# Patient Record
Sex: MALE | Race: WHITE | URBAN - METROPOLITAN AREA
[De-identification: names, ages, dates, MRNs, and addresses within clinical notes are randomized per-mention and may not be internally consistent; named-entity substitution may affect disease eponyms.]

---

## 2018-02-23 ENCOUNTER — OFFICE VISIT (OUTPATIENT)
Dept: FAMILY MEDICINE CLINIC | Age: 23
End: 2018-02-23

## 2018-02-23 VITALS
OXYGEN SATURATION: 99 % | HEART RATE: 69 BPM | WEIGHT: 185 LBS | BODY MASS INDEX: 29.73 KG/M2 | RESPIRATION RATE: 19 BRPM | DIASTOLIC BLOOD PRESSURE: 83 MMHG | HEIGHT: 66 IN | TEMPERATURE: 97.9 F | SYSTOLIC BLOOD PRESSURE: 131 MMHG

## 2018-02-23 DIAGNOSIS — Z00.00 ROUTINE GENERAL MEDICAL EXAMINATION AT A HEALTH CARE FACILITY: Primary | ICD-10-CM

## 2018-02-23 DIAGNOSIS — E66.3 OVERWEIGHT (BMI 25.0-29.9): ICD-10-CM

## 2018-02-23 NOTE — MR AVS SNAPSHOT
[address] [facility] 
[phone] Patient: Gabi Smith MRN: [id number] :1995 Visit Information Date & Time Provider Department Dept. Phone Encounter #  
 2018  2:00 PM Stacey Burgos  [facility] [phone] [id number] Follow-up Instructions Return in about 1 year (around 2019) for Annual Exam - 30 minutes. Upcoming Health Maintenance Date Due DTaP/Tdap/Td series (1 - Tdap) 2014 Allergies as of 2018  Review Complete On: 2018 By: Stacey Burgos NP No Known Allergies Current Immunizations  Reviewed on 2018 No immunizations on file. Reviewed by Stacey Burgos NP on 2018 at  2:41 PM  
You Were Diagnosed With   
  
 Codes Comments Routine general medical examination at a health care facility    -  Primary ICD-10-CM: Z00.00 ICD-9-CM: V70.0 Overweight (BMI 25.0-29. 9)     ICD-10-CM: M39.6 ICD-9-CM: 278.02 Vitals BP Pulse Temp Resp Height(growth percentile) Weight(growth percentile) 131/83 (BP 1 Location: Right arm, BP Patient Position: Sitting) 69 97.9 °F (36.6 °C) (Oral) 19 5' 6\" (1.676 m) 185 lb (83.9 kg) SpO2 BMI Smoking Status 99% 29.86 kg/m2 Never Smoker Vitals History BMI and BSA Data Body Mass Index Body Surface Area  
 29.86 kg/m 2 1.98 m 2 Preferred Pharmacy Pharmacy Name Phone [facility], [address] [place] [address] [phone] Your Updated Medication List  
  
   
This list is accurate as of 18  2:42 PM.  Always use your most recent med list.  
  
  
  
  
 VITAFOL GUMMIES 3.33 mg iron- 0.33 mg Wandra Cypher Generic drug:  -iron-FA-om-3s-dha-epa Take  by mouth. Follow-up Instructions Return in about 1 year (around 2/23/2019) for Annual Exam - 30 minutes. Patient Instructions Well Visit, Ages 25 to 48: Care Instructions Your Care Instructions Physical exams can help you stay healthy. Your doctor has checked your overall health and may have suggested ways to take good care of yourself. He or she also may have recommended tests. At home, you can help prevent illness with healthy eating, regular exercise, and other steps. Follow-up care is a key part of your treatment and safety. Be sure to make and go to all appointments, and call your doctor if you are having problems. It's also a good idea to know your test results and keep a list of the medicines you take. How can you care for yourself at home? · Reach and stay at a healthy weight. This will lower your risk for many problems, such as obesity, diabetes, heart disease, and high blood pressure. · Get at least 30 minutes of physical activity on most days of the week. Walking is a good choice. You also may want to do other activities, such as running, swimming, cycling, or playing tennis or team sports. Discuss any changes in your exercise program with your doctor. · Do not smoke or allow others to smoke around you. If you need help quitting, talk to your doctor about stop-smoking programs and medicines. These can increase your chances of quitting for good. · Talk to your doctor about whether you have any risk factors for sexually transmitted infections (STIs). Having one sex partner (who does not have STIs and does not have sex with anyone else) is a good way to avoid these infections. · Use birth control if you do not want to have children at this time. Talk with your doctor about the choices available and what might be best for you. · Protect your skin from too much sun.  When you're outdoors from 10 a.m. to 4 p.m., stay in the shade or cover up with clothing and a hat with a wide brim. Wear sunglasses that block UV rays. Even when it's cloudy, put broad-spectrum sunscreen (SPF 30 or higher) on any exposed skin. · See a dentist one or two times a year for checkups and to have your teeth cleaned. · Wear a seat belt in the car. · Drink alcohol in moderation, if at all. That means no more than 2 drinks a day for men and 1 drink a day for women. Follow your doctor's advice about when to have certain tests. These tests can spot problems early. For everyone · Cholesterol. Have the fat (cholesterol) in your blood tested after age 21. Your doctor will tell you how often to have this done based on your age, family history, or other things that can increase your risk for heart disease. · Blood pressure. Have your blood pressure checked during a routine doctor visit. Your doctor will tell you how often to check your blood pressure based on your age, your blood pressure results, and other factors. · Vision. Talk with your doctor about how often to have a glaucoma test. 
· Diabetes. Ask your doctor whether you should have tests for diabetes. · Colon cancer. Have a test for colon cancer at age 48. You may have one of several tests. If you are younger than 48, you may need a test earlier if you have any risk factors. Risk factors include whether you already had a precancerous polyp removed from your colon or whether your parent, brother, sister, or child has had colon cancer. For women · Breast exam and mammogram. Talk to your doctor about when you should have a clinical breast exam and a mammogram. Medical experts differ on whether and how often women under 50 should have these tests. Your doctor can help you decide what is right for you. · Pap test and pelvic exam. Begin Pap tests at age 24. A Pap test is the best way to find cervical cancer.  The test often is part of a pelvic exam. Ask how often to have this test. 
 · Tests for sexually transmitted infections (STIs). Ask whether you should have tests for STIs. You may be at risk if you have sex with more than one person, especially if your partners do not wear condoms. For men · Tests for sexually transmitted infections (STIs). Ask whether you should have tests for STIs. You may be at risk if you have sex with more than one person, especially if you do not wear a condom. · Testicular cancer exam. Ask your doctor whether you should check your testicles regularly. · Prostate exam. Talk to your doctor about whether you should have a blood test (called a PSA test) for prostate cancer. Experts differ on whether and when men should have this test. Some experts suggest it if you are older than 39 and are -American or have a father or brother who got prostate cancer when he was younger than 72. When should you call for help? Watch closely for changes in your health, and be sure to contact your doctor if you have any problems or symptoms that concern you. Where can you learn more? Go to http://hamilton-elvi.info/. Enter P072 in the search box to learn more about \"Well Visit, Ages 25 to 48: Care Instructions. \" Current as of: May 12, 2017 Content Version: 11.4 © 9739-0627 Healthwise, Incorporated. Care instructions adapted under license by [employer] (which disclaims liability or warranty for this information). If you have questions about a medical condition or this instruction, always ask your healthcare professional. [place] [address] any warranty or liability for your use of this information. Introducing [facility] & HEALTH SERVICES! [facility] introduces [employer] patient portal. Now you can access parts of your medical record, email your doctor's office, and request medication refills online. 1. In your internet browser, go to [email or web address] 2. Click on the First Time User? Click Here link in the Sign In box. You will see the New Member Sign Up page. 3. Enter your CATASYS Access Code exactly as it appears below. You will not need to use this code after youve completed the sign-up process. If you do not sign up before the expiration date, you must request a new code. · CATASYS Access Code: [email or web address] Expires: 5/24/2018  2:42 PM 
 
4. Enter the last four digits of your Social Security Number (xxxx) and Date of Birth (mm/dd/yyyy) as indicated and click Submit. You will be taken to the next sign-up page. 5. Create a CATASYS ID. This will be your CATASYS login ID and cannot be changed, so think of one that is secure and easy to remember. 6. Create a CATASYS password. You can change your password at any time. 7. Enter your Password Reset Question and Answer. This can be used at a later time if you forget your password. 8. Enter your e-mail address. You will receive e-mail notification when new information is available in [address] 9. Click Sign Up. You can now view and download portions of your medical record. 10. Click the Download Summary menu link to download a portable copy of your medical information. If you have questions, please visit the Frequently Asked Questions section of the CATASYS website. Remember, CATASYS is NOT to be used for urgent needs. For medical emergencies, dial 911. Now available from your iPhone and Android! Please provide this summary of care documentation to your next provider. If you have any questions after today's visit, please call [phone].

## 2018-02-23 NOTE — PROGRESS NOTES
Chief Complaint   Patient presents with    New Patient     1. Have you been to the ER, urgent care clinic since your last visit? Hospitalized since your last visit? No    2. Have you seen or consulted any other health care providers outside of the [address] [place] [address] since your last visit? Include any pap smears or colon screening.  No

## 2018-02-23 NOTE — PATIENT INSTRUCTIONS

## 2018-02-23 NOTE — PROGRESS NOTES
Marge Joe is a 23 y.o. male who was seen in clinic today (2/23/2018). Subjective:  Physical Exam  Patient presents for for annual physical exam. He denies any current medical problems or concerns. Questionnaire and forms for [employer] reviewed with patient and responses verified. Immunizations are up to date per father. Current Outpatient Prescriptions   Medication Sig Dispense Refill    -iron-FA-om-3s-dha-epa (VITAFOL GUMMIES) 3.33 mg iron- 0.33 mg chew Take  by mouth. Past Medical History:   Diagnosis Date    Communication disability     Mental retardation      Past Surgical History:   Procedure Laterality Date    HX WISDOM TEETH EXTRACTION       History reviewed. No pertinent family history. Social History   Substance Use Topics    Smoking status: Never Smoker    Smokeless tobacco: Never Used    Alcohol use No         Prior to Admission medications    Medication Sig Start Date End Date Taking? Authorizing Provider   -iron-FA-om-3s-dha-epa (VITAFOL GUMMIES) 3.33 mg iron- 0.33 mg chew Take  by mouth. Yes Historical Provider          No Known Allergies        Review of Systems   Constitutional: Negative for malaise/fatigue and weight loss. HENT: Negative for hearing loss. Eyes: Negative for blurred vision. Respiratory: Negative for shortness of breath. Cardiovascular: Negative for chest pain, palpitations and leg swelling. Gastrointestinal: Negative for abdominal pain, constipation, diarrhea and heartburn. Genitourinary: Negative for frequency and urgency. Musculoskeletal: Negative for back pain, joint pain and myalgias. Neurological: Negative for dizziness, weakness and headaches. Endo/Heme/Allergies: Does not bruise/bleed easily. Psychiatric/Behavioral: Negative for depression. Objective:   Physical Exam   Constitutional: He is oriented to person, place, and time. He appears well-developed and well-nourished. No distress. HENT:   Right Ear: Tympanic membrane and ear canal normal.   Left Ear: Tympanic membrane and ear canal normal.   Nose: No mucosal edema. Right sinus exhibits no maxillary sinus tenderness and no frontal sinus tenderness. Left sinus exhibits no maxillary sinus tenderness and no frontal sinus tenderness. Mouth/Throat: Oropharynx is clear and moist.   Eyes: EOM are normal. Pupils are equal, round, and reactive to light. Neck: Normal range of motion. Neck supple. No JVD present. Carotid bruit is not present. No thyromegaly present. Cardiovascular: Normal rate, regular rhythm, normal heart sounds and intact distal pulses. Exam reveals no gallop and no friction rub. No murmur heard. Pulmonary/Chest: Effort normal and breath sounds normal. No respiratory distress. He has no decreased breath sounds. He has no wheezes. He has no rhonchi. Abdominal: Soft. Bowel sounds are normal. He exhibits no distension. There is no tenderness. Musculoskeletal: He exhibits no edema. Lymphadenopathy:     He has no cervical adenopathy. Neurological: He is alert and oriented to person, place, and time. Psychiatric: He has a normal mood and affect. His behavior is normal.   Nursing note and vitals reviewed. Visit Vitals    /83 (BP 1 Location: Right arm, BP Patient Position: Sitting)    Pulse 69    Temp 97.9 °F (36.6 °C) (Oral)    Resp 19    Ht 5' 6\" (1.676 m)    Wt 185 lb (83.9 kg)    SpO2 99%    BMI 29.86 kg/m2       Assessment & Plan:  Diagnoses and all orders for this visit:    1. Routine general medical examination at a health care facility  Well adult, reviewed routine screenings as well as healthy diet and lifestyle practices    2. Overweight (BMI 25.0-29. 9)  Discussed need for weight loss through diet and exercise. Reviewed decreased caloric intake and increased activity. I have discussed the diagnosis with the patient and the intended plan as seen in the above orders.   The patient has received an after-visit summary along with patient information handout. I have discussed medication side effects and warnings with the patient as well. Follow-up Disposition:  Return in about 1 year (around 2/23/2019) for Annual Exam - 30 minutes.         Carson Herbert NP

## 2018-06-01 ENCOUNTER — OFFICE VISIT (OUTPATIENT)
Dept: FAMILY MEDICINE CLINIC | Age: 23
End: 2018-06-01

## 2018-06-01 VITALS
TEMPERATURE: 98.2 F | RESPIRATION RATE: 18 BRPM | DIASTOLIC BLOOD PRESSURE: 77 MMHG | HEIGHT: 66 IN | BODY MASS INDEX: 29.41 KG/M2 | SYSTOLIC BLOOD PRESSURE: 126 MMHG | OXYGEN SATURATION: 98 % | HEART RATE: 76 BPM | WEIGHT: 183 LBS

## 2018-06-01 DIAGNOSIS — F80.9 IMPAIRED VERBAL COMMUNICATION: ICD-10-CM

## 2018-06-01 DIAGNOSIS — F79 INTELLECTUAL DISABILITY: Primary | ICD-10-CM

## 2018-06-01 NOTE — PROGRESS NOTES
Chief Complaint   Patient presents with    Form Completion     patients needs a referral     1. Have you been to the ER, urgent care clinic since your last visit? Hospitalized since your last visit? No    2. Have you seen or consulted any other health care providers outside of the [address] [place] [address] since your last visit? Include any pap smears or colon screening.  No

## 2018-06-01 NOTE — MR AVS SNAPSHOT
[address] [place] [address] 
[phone] Patient: Ester Bobby MRN: [id number] :1995 Visit Information Date & Time Provider Department Dept. Phone Encounter #  
 2018  3:30 PM Shell Mendoza[facility] [phone] [id number] Follow-up Instructions Return in about 1 year (around 2019) for Annual Exam - 30 minutes. Upcoming Health Maintenance Date Due DTaP/Tdap/Td series (1 - Tdap) 2014 Influenza Age 5 to Adult 2018 Allergies as of 2018  Review Complete On: 2018 By: Uche Worthington LPN No Known Allergies Current Immunizations  Reviewed on 2018 No immunizations on file. Not reviewed this visit You Were Diagnosed With   
  
 Codes Comments Intellectual disability    -  Primary ICD-10-CM: F79 
ICD-9-CM: 676 Impaired verbal communication     ICD-10-CM: F80.9 ICD-9-CM: 784.59 Vitals BP Pulse Temp Resp Height(growth percentile) Weight(growth percentile) 126/77 (BP 1 Location: Right arm, BP Patient Position: Sitting) 76 98.2 °F (36.8 °C) (Oral) 18 5' 6\" (1.676 m) 183 lb (83 kg) SpO2 BMI Smoking Status 98% 29.54 kg/m2 Never Smoker Vitals History BMI and BSA Data Body Mass Index Body Surface Area  
 29.54 kg/m 2 1.97 m 2 Preferred Pharmacy Pharmacy Name Phone [facility] [address] [phone] Your Updated Medication List  
  
   
This list is accurate as of 18  3:53 PM.  Always use your most recent med list.  
  
  
  
  
 VITAFOL GUMMIES 3.33 mg iron- 0.33 mg Charlet Pique Generic drug:  -iron-FA-om-3s-dha-epa Take  by mouth. Follow-up Instructions Return in about 1 year (around 2019) for Annual Exam - 30 minutes. Patient Instructions Learning About Speech and Language Delays in Children What are speech and language delays? Speech and language delay means that a child is not able to use words or other forms of communication at the expected ages. Language delays include problems understanding what is heard or read. They can also be problems putting words together to form meaning. Speech delays are problems making the sounds that become words. This is the physical act of talking. Some children have both speech and language delays. Speech and language delays can have many different causes. These causes can include hearing problems, Down syndrome or other genetic disorders, autism, cerebral palsy, or mental health conditions. Delays can also run in families. Sometimes the cause is not known. If your child doesn't develop speech and language skills on schedule, it may not mean there is a problem. But if your child is having problems, talk with your doctor. He or she may suggest testing. A child can overcome many speech and language problems with treatment such as speech therapy. It helps your child learn speech and language skills. What are the symptoms? Speech and language problems include: · No babbling by 9 months. · No first words by 15 months. · No consistent words by 18 months. · No word combinations by age 2. 
· Problems following directions at age 3. 
· Not speaking in complete sentences by age 1. 
· Problems using the right words in sentences at age 3. 
· Speech that family finds hard to understand when the child is age 3. 
· Speech that strangers can't understand when the child is age 1. Other problems that affect your child's speech could include: 
· Lots of drooling, or problems sucking, chewing, or swallowing. · Problems coordinating the lips, tongue, and jaw. · Not responding when spoken to, or not reacting to loud noises. How are delays diagnosed? Diagnosis starts with your child's doctor. He or she will ask about your child's speech and language skills during regular well-child visits. The doctor will do a physical exam and ask questions about your child's past health and development. The doctor will also ask you questions about whether your child has reached speech and language milestones for his or her age. If it looks like your child has a speech or language problem, the doctor will refer your child to a speech-language pathologist (SLP). Your doctor or SLP may suggest tests to: 
· Look for other conditions. For example, your child may need a hearing test to rule out hearing loss. · Find out what speech sounds your child can say. · See if your child has problems putting speech sounds together to form words and sentences. · Review how your child is gaining speech, language, and motor skills. · Find out if your child is having other problems. These could include behavior problems. They could also include trouble doing some of the common skills for your child's age, such as sucking, chewing, or swallowing. To test your child's speech, the SLP will listen to your child talk. He or she will ask your child to say certain sounds, words, and sentences. How are delays treated? Therapy depends on the cause and type of problem. To help your child communicate better, the speech-language pathologist may: 
· Help your child learn to make all speech sounds and combine them into words. This can help your child produce the sounds more easily. · Help your child understand the meaning of words and different types of sentences. · Help your child understand social cues and communicate in various situations. · Help your child learn sign language or use devices that help children communicate. · Suggest that your child get a hearing aid, if needed.  
· Teach your child how to use special programs on a computer, tablet, or smartphone. Some programs include speech lessons. Others allow your child to communicate through objects or symbols. · Teach you how to work with your child at home and help your child practice new skills. Follow-up care is a key part of your child's treatment and safety. Be sure to make and go to all appointments, and call your doctor if your child is having problems. It's also a good idea to know your child's test results and keep a list of the medicines your child takes. Where can you learn more? Go to http://hamilton-elvi.info/. Enter N901 in the search box to learn more about \"Learning About Speech and Language Delays in Children. \" Current as of: May 12, 2017 Content Version: 11.4 © 3386-7916 Healthwise, Incorporated. Care instructions adapted under license by [employer] (which disclaims liability or warranty for this information). If you have questions about a medical condition or this instruction, always ask your healthcare professional. [address] any warranty or liability for your use of this information. Introducing [facility] & [facility]! Loki Cantu introduces [employer] patient portal. Now you can access parts of your medical record, email your doctor's office, and request medication refills online. 1. In your internet browser, go to [email or web address] 2. Click on the First Time User? Click Here link in the Sign In box. You will see the New Member Sign Up page. 3. Enter your [employer]20 Access Code exactly as it appears below. You will not need to use this code after youve completed the sign-up process. If you do not sign up before the expiration date, you must request a new code. · [employer] Access Code: [email or web address] Expires: 8/30/2018  3:53 PM 
 
4. Enter the last four digits of your Social Security Number (xxxx) and Date of Birth (mm/dd/yyyy) as indicated and click Submit.  You will be taken to the next sign-up page. 5. Create a RareCyte ID. This will be your RareCyte login ID and cannot be changed, so think of one that is secure and easy to remember. 6. Create a RareCyte password. You can change your password at any time. 7. Enter your Password Reset Question and Answer. This can be used at a later time if you forget your password. 8. Enter your e-mail address. You will receive e-mail notification when new information is available in [address] 9. Click Sign Up. You can now view and download portions of your medical record. 10. Click the Download Summary menu link to download a portable copy of your medical information. If you have questions, please visit the Frequently Asked Questions section of the [employer] website. Remember, [employer] is NOT to be used for urgent needs. For medical emergencies, dial 911. Now available from your iPhone and Android! Please provide this summary of care documentation to your next provider. Your primary care clinician is listed as Jessa Lombardo. If you have any questions after today's visit, please call [phone].

## 2018-06-01 NOTE — PROGRESS NOTES
[facility] Note    Mary Ruth is a 20 y.o. male who was seen in clinic today (6/1/2018). Subjective:  Patient has intellectual disability and communication disability. He graduated high school. Requesting evaluation for PT/OT/ST. Forms completed for referral.       Prior to Admission medications    Medication Sig Start Date End Date Taking? Authorizing Provider   -iron-FA-om-3s-dha-epa (VITAFOL GUMMIES) 3.33 mg iron- 0.33 mg chew Take  by mouth. Yes Historical Provider          No Known Allergies        ROS  See HPI    Objective:   Physical Exam   Constitutional: He is oriented to person, place, and time. He appears well-developed and well-nourished. Cardiovascular: Normal rate, regular rhythm and intact distal pulses. Exam reveals no gallop and no friction rub. No murmur heard. Pulmonary/Chest: Effort normal and breath sounds normal. No respiratory distress. Neurological: He is alert and oriented to person, place, and time. Psychiatric: He has a normal mood and affect. His behavior is normal. Thought content normal. His speech is delayed and slurred. Nursing note and vitals reviewed. Visit Vitals    /77 (BP 1 Location: Right arm, BP Patient Position: Sitting)    Pulse 76    Temp 98.2 °F (36.8 °C) (Oral)    Resp 18    Ht 5' 6\" (1.676 m)    Wt 183 lb (83 kg)    SpO2 98%    BMI 29.54 kg/m2       Assessment & Plan:  Diagnoses and all orders for this visit:    1. Intellectual disability  Referral to therapy evaluations. 2. Impaired verbal communication  As above. Communication assistive devices as needed. I have discussed the diagnosis with the patient and the intended plan as seen in the above orders. The patient has received an after-visit summary along with patient information handout. I have discussed medication side effects and warnings with the patient as well.     Follow-up Disposition:  Return in about 1 year (around 6/1/2019) for Annual Exam - 30 minutes.         Mini Dobbins, NP

## 2018-06-01 NOTE — PATIENT INSTRUCTIONS
Learning About Speech and Language Delays in Children  What are speech and language delays? Speech and language delay means that a child is not able to use words or other forms of communication at the expected ages. Language delays include problems understanding what is heard or read. They can also be problems putting words together to form meaning. Speech delays are problems making the sounds that become words. This is the physical act of talking. Some children have both speech and language delays. Speech and language delays can have many different causes. These causes can include hearing problems, Down syndrome or other genetic disorders, autism, cerebral palsy, or mental health conditions. Delays can also run in families. Sometimes the cause is not known. If your child doesn't develop speech and language skills on schedule, it may not mean there is a problem. But if your child is having problems, talk with your doctor. He or she may suggest testing. A child can overcome many speech and language problems with treatment such as speech therapy. It helps your child learn speech and language skills. What are the symptoms? Speech and language problems include:  · No babbling by 9 months. · No first words by 15 months. · No consistent words by 18 months. · No word combinations by age 2.  · Problems following directions at age 3.  · Not speaking in complete sentences by age 1.  · Problems using the right words in sentences at age 3.  · Speech that family finds hard to understand when the child is age 3.  · Speech that strangers can't understand when the child is age 1. Other problems that affect your child's speech could include:  · Lots of drooling, or problems sucking, chewing, or swallowing. · Problems coordinating the lips, tongue, and jaw. · Not responding when spoken to, or not reacting to loud noises. How are delays diagnosed? Diagnosis starts with your child's doctor.  He or she will ask about your child's speech and language skills during regular well-child visits. The doctor will do a physical exam and ask questions about your child's past health and development. The doctor will also ask you questions about whether your child has reached speech and language milestones for his or her age. If it looks like your child has a speech or language problem, the doctor will refer your child to a speech-language pathologist (SLP). Your doctor or SLP may suggest tests to:  · Look for other conditions. For example, your child may need a hearing test to rule out hearing loss. · Find out what speech sounds your child can say. · See if your child has problems putting speech sounds together to form words and sentences. · Review how your child is gaining speech, language, and motor skills. · Find out if your child is having other problems. These could include behavior problems. They could also include trouble doing some of the common skills for your child's age, such as sucking, chewing, or swallowing. To test your child's speech, the SLP will listen to your child talk. He or she will ask your child to say certain sounds, words, and sentences. How are delays treated? Therapy depends on the cause and type of problem. To help your child communicate better, the speech-language pathologist may:  · Help your child learn to make all speech sounds and combine them into words. This can help your child produce the sounds more easily. · Help your child understand the meaning of words and different types of sentences. · Help your child understand social cues and communicate in various situations. · Help your child learn sign language or use devices that help children communicate. · Suggest that your child get a hearing aid, if needed. · Teach your child how to use special programs on a computer, tablet, or smartphone. Some programs include speech lessons.  Others allow your child to communicate through objects or symbols. · Teach you how to work with your child at home and help your child practice new skills. Follow-up care is a key part of your child's treatment and safety. Be sure to make and go to all appointments, and call your doctor if your child is having problems. It's also a good idea to know your child's test results and keep a list of the medicines your child takes. Where can you learn more? Go to http://hamilton-elvi.info/. Enter Q239 in the search box to learn more about \"Learning About Speech and Language Delays in Children. \"  Current as of: May 12, 2017  Content Version: 11.4  © 1740-5111 [employer]. Care instructions adapted under license by [employer] (which disclaims liability or warranty for this information). If you have questions about a medical condition or this instruction, always ask your healthcare professional. [place] [address] any warranty or liability for your use of this information.

## 2022-03-19 PROBLEM — F80.9 IMPAIRED VERBAL COMMUNICATION: Status: ACTIVE | Noted: 2018-06-01

## 2022-03-19 PROBLEM — F79 INTELLECTUAL DISABILITY: Status: ACTIVE | Noted: 2018-06-01

## 2022-11-25 ENCOUNTER — OFFICE VISIT (OUTPATIENT)
Dept: FAMILY MEDICINE CLINIC | Age: 27
End: 2022-11-25
Payer: MEDICARE

## 2022-11-25 VITALS
WEIGHT: 185.2 LBS | RESPIRATION RATE: 16 BRPM | SYSTOLIC BLOOD PRESSURE: 129 MMHG | BODY MASS INDEX: 29.77 KG/M2 | TEMPERATURE: 98.9 F | HEIGHT: 66 IN | HEART RATE: 78 BPM | DIASTOLIC BLOOD PRESSURE: 89 MMHG | OXYGEN SATURATION: 97 %

## 2022-11-25 DIAGNOSIS — Z11.59 NEED FOR HEPATITIS C SCREENING TEST: ICD-10-CM

## 2022-11-25 DIAGNOSIS — Z00.00 MEDICARE ANNUAL WELLNESS VISIT, SUBSEQUENT: ICD-10-CM

## 2022-11-25 DIAGNOSIS — F80.9 IMPAIRED VERBAL COMMUNICATION: ICD-10-CM

## 2022-11-25 DIAGNOSIS — F79 INTELLECTUAL DISABILITY: ICD-10-CM

## 2022-11-25 DIAGNOSIS — Z82.0 FAMILY HISTORY OF MUSCULAR DYSTROPHY: ICD-10-CM

## 2022-11-25 DIAGNOSIS — R25.3 TWITCHING: ICD-10-CM

## 2022-11-25 DIAGNOSIS — Z00.00 WELLNESS EXAMINATION: Primary | ICD-10-CM

## 2022-11-25 DIAGNOSIS — Z00.00 ENCOUNTER FOR MEDICAL EXAMINATION TO ESTABLISH CARE: ICD-10-CM

## 2022-11-25 DIAGNOSIS — Z02.89 ENCOUNTER FOR COMPLETION OF FORM WITH PATIENT: ICD-10-CM

## 2022-11-25 LAB
ALBUMIN SERPL-MCNC: 4.4 G/DL (ref 3.5–5)
ALBUMIN/GLOB SERPL: 1.4 {RATIO} (ref 1.1–2.2)
ALP SERPL-CCNC: 61 U/L (ref 45–117)
ALT SERPL-CCNC: 45 U/L (ref 12–78)
ANION GAP SERPL CALC-SCNC: 2 MMOL/L (ref 5–15)
AST SERPL-CCNC: 15 U/L (ref 15–37)
BASOPHILS # BLD: 0 K/UL (ref 0–0.1)
BASOPHILS NFR BLD: 1 % (ref 0–1)
BILIRUB SERPL-MCNC: 0.4 MG/DL (ref 0.2–1)
BUN SERPL-MCNC: 15 MG/DL (ref 6–20)
BUN/CREAT SERPL: 16 (ref 12–20)
CALCIUM SERPL-MCNC: 9.6 MG/DL (ref 8.5–10.1)
CHLORIDE SERPL-SCNC: 107 MMOL/L (ref 97–108)
CO2 SERPL-SCNC: 32 MMOL/L (ref 21–32)
CREAT SERPL-MCNC: 0.94 MG/DL (ref 0.7–1.3)
DIFFERENTIAL METHOD BLD: NORMAL
EOSINOPHIL # BLD: 0.2 K/UL (ref 0–0.4)
EOSINOPHIL NFR BLD: 2 % (ref 0–7)
ERYTHROCYTE [DISTWIDTH] IN BLOOD BY AUTOMATED COUNT: 12.3 % (ref 11.5–14.5)
EST. AVERAGE GLUCOSE BLD GHB EST-MCNC: 103 MG/DL
FOLATE SERPL-MCNC: 19.7 NG/ML (ref 5–21)
GLOBULIN SER CALC-MCNC: 3.2 G/DL (ref 2–4)
GLUCOSE SERPL-MCNC: 85 MG/DL (ref 65–100)
HBA1C MFR BLD: 5.2 % (ref 4–5.6)
HCT VFR BLD AUTO: 49.6 % (ref 36.6–50.3)
HCV AB SERPL QL IA: NONREACTIVE
HGB BLD-MCNC: 16.1 G/DL (ref 12.1–17)
IMM GRANULOCYTES # BLD AUTO: 0 K/UL (ref 0–0.04)
IMM GRANULOCYTES NFR BLD AUTO: 0 % (ref 0–0.5)
LYMPHOCYTES # BLD: 2.2 K/UL (ref 0.8–3.5)
LYMPHOCYTES NFR BLD: 36 % (ref 12–49)
MAGNESIUM SERPL-MCNC: 2.5 MG/DL (ref 1.6–2.4)
MCH RBC QN AUTO: 29.2 PG (ref 26–34)
MCHC RBC AUTO-ENTMCNC: 32.5 G/DL (ref 30–36.5)
MCV RBC AUTO: 89.9 FL (ref 80–99)
MONOCYTES # BLD: 0.5 K/UL (ref 0–1)
MONOCYTES NFR BLD: 9 % (ref 5–13)
NEUTS SEG # BLD: 3.2 K/UL (ref 1.8–8)
NEUTS SEG NFR BLD: 52 % (ref 32–75)
NRBC # BLD: 0 K/UL (ref 0–0.01)
NRBC BLD-RTO: 0 PER 100 WBC
PLATELET # BLD AUTO: 210 K/UL (ref 150–400)
PMV BLD AUTO: 10.4 FL (ref 8.9–12.9)
POTASSIUM SERPL-SCNC: 4.6 MMOL/L (ref 3.5–5.1)
PROT SERPL-MCNC: 7.6 G/DL (ref 6.4–8.2)
RBC # BLD AUTO: 5.52 M/UL (ref 4.1–5.7)
SODIUM SERPL-SCNC: 141 MMOL/L (ref 136–145)
VIT B12 SERPL-MCNC: 716 PG/ML (ref 193–986)
WBC # BLD AUTO: 6.1 K/UL (ref 4.1–11.1)

## 2022-11-25 PROCEDURE — 99204 OFFICE O/P NEW MOD 45 MIN: CPT | Performed by: NURSE PRACTITIONER

## 2022-11-25 PROCEDURE — G0439 PPPS, SUBSEQ VISIT: HCPCS | Performed by: NURSE PRACTITIONER

## 2022-11-25 PROCEDURE — 99080 SPECIAL REPORTS OR FORMS: CPT | Performed by: NURSE PRACTITIONER

## 2022-11-25 PROCEDURE — G8432 DEP SCR NOT DOC, RNG: HCPCS | Performed by: NURSE PRACTITIONER

## 2022-11-25 PROCEDURE — G8419 CALC BMI OUT NRM PARAM NOF/U: HCPCS | Performed by: NURSE PRACTITIONER

## 2022-11-25 PROCEDURE — G8427 DOCREV CUR MEDS BY ELIG CLIN: HCPCS | Performed by: NURSE PRACTITIONER

## 2022-11-25 NOTE — ACP (ADVANCE CARE PLANNING)
Mr. Demarcus Alford is under guardianship with his uncle Parisa Peterson. Parisa Peterson serves as his medical decision-maker.   There is no advance care directive in place at this time

## 2022-11-25 NOTE — PROGRESS NOTES
Chief Complaint   Patient presents with    Physical    Establish Care    Other     Twitching       1. \"Have you been to the ER, urgent care clinic since your last visit? Hospitalized since your last visit? \" No    2. \"Have you seen or consulted any other health care providers outside of the [address] [place] [address] since your last visit? \" No     3. For patients over 45: Has the patient had a colonoscopy? NA - based on age       1 most recent PHQ Screens 11/25/2022   Little interest or pleasure in doing things Not at all   Feeling down, depressed, irritable, or hopeless Not at all   Total Score PHQ 2 0         Abuse Screening Questionnaire 2/23/2018   Do you ever feel afraid of your partner? N   Are you in a relationship with someone who physically or mentally threatens you? N   Is it safe for you to go home?  Y          Health Maintenance Due   Topic Date Due    Hepatitis C Screening  Never done    Depression Screen  Never done    COVID-19 Vaccine (1) Never done    DTaP/Tdap/Td series (1 - Tdap) Never done    Flu Vaccine (1) 08/01/2022    Medicare Yearly Exam  11/07/2022     Pt declined flu shot

## 2022-11-25 NOTE — PROGRESS NOTES
This is the Subsequent Medicare Annual Wellness Exam, performed 12 months or more after the Initial AWV or the last Subsequent AWV    I have reviewed the patient's medical history in detail and updated the computerized patient record. Assessment/Plan   Education and counseling provided:  Are appropriate based on today's review and evaluation  Influenza Vaccine  Diabetes screening test    1. Wellness examination  -     CBC WITH AUTOMATED DIFF; Future  -     METABOLIC PANEL, COMPREHENSIVE; Future  -     MAGNESIUM; Future  -     VITAMIN B12 & FOLATE; Future  -     HEMOGLOBIN A1C WITH EAG; Future  2. Encounter for medical examination to establish care  3. Medicare annual wellness visit, subsequent  4. Twitching  -     REFERRAL TO NEUROLOGY  5. Family history of muscular dystrophy  -     REFERRAL TO NEUROLOGY  6. Need for hepatitis C screening test  -     HEPATITIS C AB; Future  7. Intellectual disability  Comments:  Guardianship under his uncle, Jovany Wharton   8. Impaired verbal communication  9. Encounter for completion of form with patient       Depression Risk Factor Screening     3 most recent PHQ Screens 11/25/2022   Little interest or pleasure in doing things Not at all   Feeling down, depressed, irritable, or hopeless Not at all   Total Score PHQ 2 0       Alcohol & Drug Abuse Risk Screen    Do you average more than 2 drinks per night or 14 drinks a week: No    On any one occasion in the past three months have you have had more than 4 drinks containing alcohol:  No          Functional Ability and Level of Safety    Hearing: Hearing is good. Activities of Daily Living: The home contains: no safety equipment. Patient needs help with:  transportation and managing money      Ambulation: with no difficulty     Fall Risk:  Fall Risk Assessment, last 12 mths 2/23/2018   Able to walk? Yes   Fall in past 12 months?  No      Abuse Screen:  Patient is not abused       Cognitive Screening    Has your family/caregiver stated any concerns about your memory: no     Cognitive Screening: Normal - normal    Health Maintenance Due     Health Maintenance Due   Topic Date Due    Hepatitis C Screening  Never done    Depression Screen  Never done    COVID-19 Vaccine (1) Never done    DTaP/Tdap/Td series (1 - Tdap) Never done    Flu Vaccine (1) 08/01/2022       Patient Care Team   Patient Care Team:  Rafi Hagen NP as PCP - General (Nurse Practitioner)    History     Patient Active Problem List   Diagnosis Code    Intellectual disability F79    Impaired verbal communication F80.9    Family history of muscular dystrophy Z82.0    Twitching R25.3     Past Medical History:   Diagnosis Date    Communication disability     Intellectual disability       Past Surgical History:   Procedure Laterality Date    HX WISDOM TEETH EXTRACTION       Current Outpatient Medications   Medication Sig Dispense Refill    -iron-FA-om-3s-dha-epa 3.33 mg iron- 0.33 mg chew Take  by mouth. (Patient not taking: Reported on 11/25/2022)       No Known Allergies    History reviewed. No pertinent family history.   Social History     Tobacco Use    Smoking status: Never    Smokeless tobacco: Never   Substance Use Topics    Alcohol use: No         Scotty Hardin NP

## 2022-11-25 NOTE — PROGRESS NOTES
[facility]\"" Note     Tk Dior (: 1995) is a 32 y.o. male, established patient, here for evaluation of the following chief complaint(s):  Physical, Establish Care, and Other (Twitching)       ASSESSMENT/PLAN:  Diagnoses and all orders for this visit:    1. Wellness examination  -     CBC WITH AUTOMATED DIFF; Future  -     METABOLIC PANEL, COMPREHENSIVE; Future  -     MAGNESIUM; Future  -     VITAMIN B12 & FOLATE; Future  -     HEMOGLOBIN A1C WITH EAG; Future    2. Encounter for medical examination to establish care    3. Medicare annual wellness visit, subsequent    4. Twitching  -     REFERRAL TO NEUROLOGY    5. Family history of muscular dystrophy  -     REFERRAL TO NEUROLOGY    6. Need for hepatitis C screening test  -     HEPATITIS C AB; Future    7. Intellectual disability  Comments:  Guardianship under his uncle, Marcela Wyman     8. Impaired verbal communication    9. Encounter for form completion with patient  -Mr. King  Questionnaire and forms for [employer] reviewed with patient and responses verified. Completed and signed      Return in about 1 year (around 2023), or if symptoms worsen or fail to improve. SUBJECTIVE/OBJECTIVE:    Tk Dior is a 32 y.o. male seen today for establishing care with provider. He was last scene at this practice in 2018. Mr. Drea Arrington is accompanied by his uncle, Marcela Wyman who is also his guardian. His uncle became his guardian after his mother passed away in 2016. Mr. Rai Medel and forms for [employer] reviewed with patient and responses verified. Twitching:  Family hx of Limb girdle  & Miracle PURDY (mother and maternal grandmother, cousin with markers for MD) . Reports worsening muscles twitching that has become worse over the last year.   The twitching tends to be generalized however it has been observed by his uncle that his left arm will twitch 4-5 times in a 30min people. Cardiovascular Review:  He has no known cardiovascular conditions. Diet and Lifestyle: not attempting to follow a low fat, low cholesterol diet, sedentary  Home BP Monitoring: is not measured at home. Pertinent ROS: no TIA's, no chest pain on exertion, no dyspnea on exertion, no swelling of ankles. Occupation: None  Education: High school  Exercise: No  Tobacco: No  ETOH: No  SA: no, never  Hep C: due           REVIEW OF SYSTEMS:    Review of Systems   Constitutional: Negative. HENT: Negative. Respiratory: Negative. Cardiovascular: Negative. Gastrointestinal: Negative. Genitourinary: Negative. Musculoskeletal:  Negative for back pain and gait problem. Intermittent muscle twitching throughout the day and night   Skin: Negative. Neurological: Negative. VITAL SIGNS:    Wt Readings from Last 3 Encounters:   11/25/22 185 lb 3.2 oz (84 kg)   06/01/18 183 lb (83 kg)   02/23/18 185 lb (83.9 kg)     Temp Readings from Last 3 Encounters:   11/25/22 98.9 °F (37.2 °C) (Temporal)   06/01/18 98.2 °F (36.8 °C) (Oral)   02/23/18 97.9 °F (36.6 °C) (Oral)     BP Readings from Last 3 Encounters:   11/25/22 129/89   06/01/18 126/77   02/23/18 131/83     Pulse Readings from Last 3 Encounters:   11/25/22 78   06/01/18 76   02/23/18 69           PHYSICAL EXAMINATION:       General: Alert, cooperative, no distress  Eyes: Conjunctivae clear. Pupils equally round and reactive to light, difficulty participating in EOM exam  Ears: Normal external ear canals both ears. Nose: Nares normal. Septum midline. Mucosa normal. No drainage or sinus tenderness. Mouth/Throat: Lips, mucosa, and tongue normal. No oropharyngeal erythema. No tonsillar enlargement or exudate. Neck: Supple, symmetrical, trachea midline, no adenopathy. No thyroid enlargement/tenderness/nodules  Respiratory: Breathing comfortably, in no acute respiratory distress. Clear to auscultation bilaterally.  Normal inspiratory and expiratory ratio. Cardiovascular: Regular rate and rhythm, S1, S2 normal, no murmur, click, rub or gallop. Extremities: no edema. Pulses 2+ and symmetric radial and dorsalis pedis   Abdomen: Soft, non-tender, not distended. Bowel sounds normal. No masses or organomegaly. MSK: Extremities normal appearing, atraumatic, no effusion. Gait steady and unassisted. Skin: Skin color, texture, turgor normal. No rashes or lesions on exposed skin. Lymph nodes: Cervical, supraclavicular nodes normal.  Neurologic: Cranial nerves II-XII intact. Strength 5/5 grossly. Sensation and reflexes normal throughout. Psychiatric: Normal affect. Mood euthymic. Treatment risks/benefits/costs/interactions/alternatives discussed with patient. Advised patient to call back or return to office if symptoms worsen/change/persist. If patient cannot reach us or should anything more severe/urgent arise he/she should proceed directly to the nearest emergency department. Discussed expected course/resolution/complications of diagnosis in detail with patient. Patient expressed understanding with the diagnosis and plan. An electronic signature was used to authenticate this note.   -- Jose C Escalante NP

## 2023-07-07 ENCOUNTER — OFFICE VISIT (OUTPATIENT)
Age: 28
End: 2023-07-07
Payer: COMMERCIAL

## 2023-07-07 VITALS
WEIGHT: 188 LBS | RESPIRATION RATE: 16 BRPM | BODY MASS INDEX: 28.49 KG/M2 | DIASTOLIC BLOOD PRESSURE: 72 MMHG | OXYGEN SATURATION: 99 % | SYSTOLIC BLOOD PRESSURE: 122 MMHG | HEART RATE: 72 BPM | HEIGHT: 68 IN

## 2023-07-07 DIAGNOSIS — G25.3 MYOCLONIC JERKING: Primary | ICD-10-CM

## 2023-07-07 DIAGNOSIS — Z82.0 FAMILY HISTORY OF MUSCULAR DYSTROPHY: ICD-10-CM

## 2023-07-07 DIAGNOSIS — F79 INTELLECTUAL DISABILITY: ICD-10-CM

## 2023-07-07 PROCEDURE — 99204 OFFICE O/P NEW MOD 45 MIN: CPT | Performed by: PSYCHIATRY & NEUROLOGY

## 2023-07-07 ASSESSMENT — PATIENT HEALTH QUESTIONNAIRE - PHQ9
2. FEELING DOWN, DEPRESSED OR HOPELESS: 0
SUM OF ALL RESPONSES TO PHQ QUESTIONS 1-9: 0
1. LITTLE INTEREST OR PLEASURE IN DOING THINGS: 0
SUM OF ALL RESPONSES TO PHQ9 QUESTIONS 1 & 2: 0

## 2023-07-07 NOTE — PROGRESS NOTES
Chief Complaint   Patient presents with    New Patient     [facility]  Be Goldstein is a 25 y.o. male who came in for neurological evaluation regarding involuntary movements in his extremities. He has intellectual disability with mainly speech and language delay and slow thought processing. He graduated from high school and is independent with all activities of daily living  He also has a strong family history of limb-girdle muscular dystrophy in his mother, grandmother, and and another cousin. His mother passed away and he now lives with his uncle who came with him to the appointment today. For the past few years they have noticed occasional involuntary twitching/jerking movement mainly of his upper extremities. It was infrequent but now it is more noticeable and occurs at least 1-2 times every minute. He describes this as a single isolated jerk. There are no known triggers. He has never had a convulsion or loss of consciousness. He denies any symptoms of muscle weakness, exercise intolerance, decreased endurance or easy fatigability. Family is concerned if he could be developing the muscular dystrophy. Past Medical History:   Diagnosis Date    Communication disability     Intellectual disability      No current outpatient medications on file. No current facility-administered medications for this visit.      No Known Allergies  Family History   Problem Relation Age of Onset    Muscular Dystrophy Mother     Muscular Dystrophy Maternal Aunt     Muscular Dystrophy Maternal Grandmother     Heart Attack Paternal Grandfather      Social History     Tobacco Use    Smoking status: Never    Smokeless tobacco: Never   Vaping Use    Vaping Use: Never used   Substance Use Topics    Alcohol use: No    Drug use: No     Past Surgical History:   Procedure Laterality Date    WISDOM TOOTH EXTRACTION           REVIEW OF SYSTEMS  Review of Systems - History obtained from

## 2023-07-10 ENCOUNTER — CLINICAL DOCUMENTATION (OUTPATIENT)
Age: 28
End: 2023-07-10

## 2023-07-18 ENCOUNTER — TELEPHONE (OUTPATIENT)
Age: 28
End: 2023-07-18

## 2023-08-04 ENCOUNTER — HOSPITAL ENCOUNTER (OUTPATIENT)
Facility: HOSPITAL | Age: 28
End: 2023-08-04
Attending: PSYCHIATRY & NEUROLOGY
Payer: MEDICARE

## 2023-08-04 ENCOUNTER — HOSPITAL ENCOUNTER (OUTPATIENT)
Facility: HOSPITAL | Age: 28
End: 2023-08-04
Payer: MEDICARE

## 2023-08-04 DIAGNOSIS — G25.3 MYOCLONIC JERKING: ICD-10-CM

## 2023-08-04 DIAGNOSIS — F79 INTELLECTUAL DISABILITY: ICD-10-CM

## 2023-08-04 PROCEDURE — 95816 EEG AWAKE AND DROWSY: CPT

## 2023-08-04 PROCEDURE — 70551 MRI BRAIN STEM W/O DYE: CPT

## 2023-08-07 ENCOUNTER — PROCEDURE VISIT (OUTPATIENT)
Age: 28
End: 2023-08-07
Payer: MEDICARE

## 2023-08-07 DIAGNOSIS — R25.3 MUSCLE TWITCHING: ICD-10-CM

## 2023-08-07 DIAGNOSIS — M62.9 MUSCLE DISEASE: Primary | ICD-10-CM

## 2023-08-07 PROCEDURE — 95886 MUSC TEST DONE W/N TEST COMP: CPT | Performed by: PSYCHIATRY & NEUROLOGY

## 2023-08-07 PROCEDURE — 95911 NRV CNDJ TEST 9-10 STUDIES: CPT | Performed by: PSYCHIATRY & NEUROLOGY

## 2023-08-22 PROBLEM — G25.3 MYOCLONIC JERKING: Status: ACTIVE | Noted: 2023-08-22

## 2023-10-13 ENCOUNTER — TELEPHONE (OUTPATIENT)
Age: 28
End: 2023-10-13

## 2023-10-13 NOTE — TELEPHONE ENCOUNTER
Patient's uncle, Isaac Fuentes, requesting access to Jeff's genetic testing results. He is unable to access the results through [facility].     Pls call Olga Vasquez  [phone]

## 2023-10-19 NOTE — TELEPHONE ENCOUNTER
Called Levy. Let him know I called [facility] to fax us the results. Received the fax. Will inform the provider to review.

## 2023-10-20 NOTE — TELEPHONE ENCOUNTER
Called patient's EC. Informed him that the provider reviewed the genetic testing results stating, \"Please inform that he tested negative for the limb-girdle muscular dystrophy genes. \" He wanted a copy of the genetic testing results as well as the EMG as he can not see it in the chart. Verified address on phone. Mailed both results to his home.